# Patient Record
Sex: MALE | NOT HISPANIC OR LATINO | Employment: FULL TIME | URBAN - METROPOLITAN AREA
[De-identification: names, ages, dates, MRNs, and addresses within clinical notes are randomized per-mention and may not be internally consistent; named-entity substitution may affect disease eponyms.]

---

## 2023-11-16 ENCOUNTER — OFFICE VISIT (OUTPATIENT)
Dept: PRIMARY CARE | Facility: CLINIC | Age: 33
End: 2023-11-16
Payer: COMMERCIAL

## 2023-11-16 VITALS
WEIGHT: 227 LBS | SYSTOLIC BLOOD PRESSURE: 128 MMHG | DIASTOLIC BLOOD PRESSURE: 80 MMHG | HEART RATE: 66 BPM | BODY MASS INDEX: 28.37 KG/M2 | OXYGEN SATURATION: 98 %

## 2023-11-16 DIAGNOSIS — S09.91XA TRAUMA OF EAR CANAL, INITIAL ENCOUNTER: Primary | ICD-10-CM

## 2023-11-16 PROCEDURE — 99214 OFFICE O/P EST MOD 30 MIN: CPT | Performed by: FAMILY MEDICINE

## 2023-11-16 PROCEDURE — 1036F TOBACCO NON-USER: CPT | Performed by: FAMILY MEDICINE

## 2023-11-16 RX ORDER — CIPROFLOXACIN AND DEXAMETHASONE 3; 1 MG/ML; MG/ML
4 SUSPENSION/ DROPS AURICULAR (OTIC) 2 TIMES DAILY
Qty: 7.5 ML | Refills: 0 | Status: SHIPPED | OUTPATIENT
Start: 2023-11-16 | End: 2023-11-23

## 2023-11-16 ASSESSMENT — PATIENT HEALTH QUESTIONNAIRE - PHQ9
2. FEELING DOWN, DEPRESSED OR HOPELESS: NOT AT ALL
1. LITTLE INTEREST OR PLEASURE IN DOING THINGS: NOT AT ALL
SUM OF ALL RESPONSES TO PHQ9 QUESTIONS 1 AND 2: 0

## 2023-11-16 NOTE — PATIENT INSTRUCTIONS
You scratch the ear canal causing swelling and pain   It appears to be healing  I sent in an ear drop with antibiotic and steroid to help reduce chance of a secondary infection and also to help with the inflammation and pain.     Follow up with Vonda for your yearly physical ( labs, etc.)

## 2025-03-21 ENCOUNTER — OFFICE VISIT (OUTPATIENT)
Dept: PRIMARY CARE | Facility: CLINIC | Age: 35
End: 2025-03-21
Payer: COMMERCIAL

## 2025-03-21 VITALS
OXYGEN SATURATION: 98 % | HEART RATE: 63 BPM | HEIGHT: 75 IN | SYSTOLIC BLOOD PRESSURE: 144 MMHG | BODY MASS INDEX: 27.85 KG/M2 | WEIGHT: 224 LBS | DIASTOLIC BLOOD PRESSURE: 86 MMHG

## 2025-03-21 DIAGNOSIS — R03.0 ELEVATED BLOOD PRESSURE READING: ICD-10-CM

## 2025-03-21 DIAGNOSIS — Z30.09 SCREENING AND EVALUATION FOR VASECTOMY: ICD-10-CM

## 2025-03-21 DIAGNOSIS — E66.3 OVERWEIGHT WITH BODY MASS INDEX (BMI) OF 28 TO 28.9 IN ADULT: ICD-10-CM

## 2025-03-21 DIAGNOSIS — Z00.00 ANNUAL PHYSICAL EXAM: Primary | ICD-10-CM

## 2025-03-21 PROCEDURE — 1036F TOBACCO NON-USER: CPT | Performed by: NURSE PRACTITIONER

## 2025-03-21 PROCEDURE — 99395 PREV VISIT EST AGE 18-39: CPT | Performed by: NURSE PRACTITIONER

## 2025-03-21 PROCEDURE — 3008F BODY MASS INDEX DOCD: CPT | Performed by: NURSE PRACTITIONER

## 2025-03-21 ASSESSMENT — PROMIS GLOBAL HEALTH SCALE
RATE_QUALITY_OF_LIFE: VERY GOOD
RATE_PHYSICAL_HEALTH: GOOD
RATE_GENERAL_HEALTH: VERY GOOD
RATE_AVERAGE_PAIN: 1
EMOTIONAL_PROBLEMS: RARELY
RATE_AVERAGE_FATIGUE: MILD
CARRYOUT_SOCIAL_ACTIVITIES: EXCELLENT
RATE_MENTAL_HEALTH: VERY GOOD
CARRYOUT_PHYSICAL_ACTIVITIES: COMPLETELY
RATE_SOCIAL_SATISFACTION: EXCELLENT

## 2025-03-21 NOTE — ASSESSMENT & PLAN NOTE
Guidelines reviewed with pt and pt given handout for reference  Encouraged to get home BP cuff and check BP 2 days/week  Call readings in 1 month  FU 6 mos to check BP   Orders:    Follow Up In Primary Care - Established; Future     no

## 2025-03-21 NOTE — PATIENT INSTRUCTIONS
Thank you for seeing me today Benny Kelley, it was a pleasure to see you again!    Today we did your Annual Physical Exam and discussed the following:     Referral for vasectomy placed    Your blood pressure should be </= 130/80.    Today your blood pressure was 144/86  You should avoid foods that are high in sodium and saturated fats  Advised patient to avoid lunch meats, canned soups, pizzas, bread rolls, and sandwiches.   Advised patient to limit salt intake 1,500 mg daily.   Advised patient to exercise 30 mins/3-4 times a week including treadmill or aerobic type.  Avoid stressful situations as this can increase your blood pressure      Obtain a BP monitor and check your blood pressure at home. Check it around the same time each day, at least 1 hour after taking your medication.  Record your blood pressure in a log and  bring your log with you to your next appointment.    Lab work ordered today.  Please have your blood drawn in the next 1-2 weeks.  You need to be FASTING for 12 hours prior to blood draw.  You may only have water.  Please contact your insurance company to ask about the best location to get blood drawn.  We will contact you with the results of your blood work and any necessary adjustments  to your plan of care, if you do not hear from us within 3-5 days of having your blood drawn, please call the office at [phone].      For assistance with scheduling referrals or consultations, please call [phone] or [phone].    For laboratory, radiology, and other tests, please call [phone] ([phone] for pediatrics).   For laboratory locations, please visit [email or web address]   If you do not get results within 7-10 days, or you have any questions or concerns, please send a message, call the office ([phone]), or return to the office for a follow-up appointment.     For acute/sick visits, if you are unable to get an office visit, you can do  a  On Demand Virtual Visit that is accessible via your My Chart account.  For emergencies, call 9-1-1 or go to the nearest Emergency Department.     Please schedule additional appointment(s) to address concern(s) not addressed today.    Please review prescription inserts and published information for possible adverse effects of all medications.     In general, results are discussed over the phone or via  [employer].     You can see your health information, review clinical summaries from office visits & test results online when you follow your health with MY  Chart, a personal health record.   To sign up go to [email or web address].   If you need assistance with signing up or trouble getting into your account call [employer] Patient Line 24/7 at [phone]     RTC 6 MONTHS FOR BP F/U     ~Vonda Burns NP

## 2025-03-21 NOTE — ASSESSMENT & PLAN NOTE
- Counseled on healthy diet and regular exercise  - Fall avoidance information provided  - Personalized prevention plan provided   - Vaccines UTD   - FBW ordered       Orders:    Comprehensive Metabolic Panel; Future    Lipid Panel; Future    CBC; Future

## 2025-04-08 ENCOUNTER — OFFICE VISIT (OUTPATIENT)
Dept: PRIMARY CARE | Facility: CLINIC | Age: 35
End: 2025-04-08
Payer: COMMERCIAL

## 2025-04-08 VITALS
HEIGHT: 75 IN | OXYGEN SATURATION: 98 % | HEART RATE: 80 BPM | SYSTOLIC BLOOD PRESSURE: 140 MMHG | DIASTOLIC BLOOD PRESSURE: 88 MMHG | BODY MASS INDEX: 27.43 KG/M2 | WEIGHT: 220.6 LBS

## 2025-04-08 DIAGNOSIS — I10 PRIMARY HYPERTENSION: Primary | ICD-10-CM

## 2025-04-08 LAB
ALBUMIN SERPL-MCNC: 4.8 G/DL (ref 3.6–5.1)
ALP SERPL-CCNC: 56 U/L (ref 36–130)
ALT SERPL-CCNC: 29 U/L (ref 9–46)
ANION GAP SERPL CALCULATED.4IONS-SCNC: 10 MMOL/L (CALC) (ref 7–17)
AST SERPL-CCNC: 22 U/L (ref 10–40)
BILIRUB SERPL-MCNC: 1.7 MG/DL (ref 0.2–1.2)
BUN SERPL-MCNC: 21 MG/DL (ref 7–25)
CALCIUM SERPL-MCNC: 9.5 MG/DL (ref 8.6–10.3)
CHLORIDE SERPL-SCNC: 104 MMOL/L (ref 98–110)
CHOLEST SERPL-MCNC: 129 MG/DL
CHOLEST/HDLC SERPL: 4.3 (CALC)
CO2 SERPL-SCNC: 27 MMOL/L (ref 20–32)
CREAT SERPL-MCNC: 1.18 MG/DL (ref 0.6–1.26)
EGFRCR SERPLBLD CKD-EPI 2021: 83 ML/MIN/1.73M2
ERYTHROCYTE [DISTWIDTH] IN BLOOD BY AUTOMATED COUNT: 12.9 % (ref 11–15)
GLUCOSE SERPL-MCNC: 150 MG/DL (ref 65–99)
HCT VFR BLD AUTO: 45.1 % (ref 38.5–50)
HDLC SERPL-MCNC: 30 MG/DL
HGB BLD-MCNC: 15.3 G/DL (ref 13.2–17.1)
LDLC SERPL CALC-MCNC: 73 MG/DL (CALC)
MCH RBC QN AUTO: 29.5 PG (ref 27–33)
MCHC RBC AUTO-ENTMCNC: 33.9 G/DL (ref 32–36)
MCV RBC AUTO: 86.9 FL (ref 80–100)
NONHDLC SERPL-MCNC: 99 MG/DL (CALC)
PLATELET # BLD AUTO: 180 THOUSAND/UL (ref 140–400)
PMV BLD REES-ECKER: 10.8 FL (ref 7.5–12.5)
POTASSIUM SERPL-SCNC: 4.2 MMOL/L (ref 3.5–5.3)
PROT SERPL-MCNC: 7.4 G/DL (ref 6.1–8.1)
RBC # BLD AUTO: 5.19 MILLION/UL (ref 4.2–5.8)
SODIUM SERPL-SCNC: 141 MMOL/L (ref 135–146)
TRIGL SERPL-MCNC: 188 MG/DL
WBC # BLD AUTO: 9.4 THOUSAND/UL (ref 3.8–10.8)

## 2025-04-08 PROCEDURE — 3008F BODY MASS INDEX DOCD: CPT | Performed by: NURSE PRACTITIONER

## 2025-04-08 PROCEDURE — 3079F DIAST BP 80-89 MM HG: CPT | Performed by: NURSE PRACTITIONER

## 2025-04-08 PROCEDURE — 99213 OFFICE O/P EST LOW 20 MIN: CPT | Performed by: NURSE PRACTITIONER

## 2025-04-08 PROCEDURE — 1036F TOBACCO NON-USER: CPT | Performed by: NURSE PRACTITIONER

## 2025-04-08 PROCEDURE — 3077F SYST BP >= 140 MM HG: CPT | Performed by: NURSE PRACTITIONER

## 2025-04-08 RX ORDER — LOSARTAN POTASSIUM 50 MG/1
50 TABLET ORAL DAILY
Qty: 90 TABLET | Refills: 1 | Status: SHIPPED | OUTPATIENT
Start: 2025-04-08

## 2025-04-08 ASSESSMENT — PATIENT HEALTH QUESTIONNAIRE - PHQ9
1. LITTLE INTEREST OR PLEASURE IN DOING THINGS: NOT AT ALL
2. FEELING DOWN, DEPRESSED OR HOPELESS: NOT AT ALL
SUM OF ALL RESPONSES TO PHQ9 QUESTIONS 1 AND 2: 0

## 2025-04-08 ASSESSMENT — ENCOUNTER SYMPTOMS
NECK PAIN: 0
SHORTNESS OF BREATH: 0
PND: 0
HYPERTENSION: 1
PALPITATIONS: 0
ORTHOPNEA: 0
HEADACHES: 0
SWEATS: 0
BLURRED VISION: 0

## 2025-04-08 NOTE — PATIENT INSTRUCTIONS
Thank you for seeing me today Benny Kelley, it was a pleasure to see you again!    #HTN  Your blood pressure should be </= 130/80.    Today your blood pressure was 140/88  BEGIN LOSARTAN 50 MG DAILY    You should avoid foods that are high in sodium and saturated fats  Advised patient to avoid lunch meats, canned soups, pizzas, bread rolls, and sandwiches.   Advised patient to limit salt intake 1,500 mg daily.   Advised patient to exercise 30 mins/3-4 times a week including treadmill or aerobic type.  Avoid stressful situations as this can increase your blood pressure    Take your medications as prescribed--do not skip doses    Obtain a BP monitor and check your blood pressure at home. Check it around the same time each day, at least 1 hour after taking your medication.  Record your blood pressure in a log and  bring your log with you to your next appointment.    For assistance with scheduling referrals or consultations, please call [phone] or [phone].    For laboratory, radiology, and other tests, please call [phone] ([phone] for pediatrics).   For laboratory locations, please visit [email or web address]   If you do not get results within 7-10 days, or you have any questions or concerns, please send a message, call the office ([phone]), or return to the office for a follow-up appointment.     For acute/sick visits, if you are unable to get an office visit, you can do a  On Demand Virtual Visit that is accessible via your My Chart account.  For emergencies, call 9-1-1 or go to the nearest Emergency Department.     Please schedule additional appointment(s) to address concern(s) not addressed today.    Please review prescription inserts and published information for possible adverse effects of all medications.     In general, results are discussed over the phone or via  [employer].     You can see your health information, review clinical summaries  from office visits & test results online when you follow your health with MY  Chart, a personal health record.   To sign up go to [email or web address].   If you need assistance with signing up or trouble getting into your account call [employer] Patient Line 24/7 at [phone]     RTC 2 months for BP check    ~Vonda Burns NP

## 2025-04-08 NOTE — PROGRESS NOTES
"Subjective   Chief Complaint   Patient presents with    Follow-up     Benny Kelley is a 35 y.o. male is here today for a follow up to discuss blood pressure. Patient not currently on any medication. Patient has been taking bp at home.   March 22nd: 170/106 March 24th: 164/104 March 26 (taken manually by my daughter's in home nurse): 133/100  March 30th: 146/93 April 4th: 170/94       Patient ID: Benny Gonsales" is a 35 y.o. male who presents for Follow-up (Benny Kelley is a 35 y.o. male is here today for a follow up to discuss blood pressure. Patient not currently on any medication. Patient has been taking bp at home. /March 22nd: 170/106/March 24th: 164/104/March 26 (taken manually by my daughter's in home nurse): 133/100/March 30th: 146/93/April 4th: 170/94).    Hypertension  This is a new problem. The current episode started 1 to 4 weeks ago. The problem is unchanged. The problem is resistant. Pertinent negatives include no anxiety, blurred vision, chest pain, headaches, malaise/fatigue, neck pain, orthopnea, palpitations, peripheral edema, PND, shortness of breath or sweats. Agents associated with hypertension include NSAIDs. Risk factors for coronary artery disease include obesity. There are no compliance problems.      Benny Gonsales" is a 36 yo presenting today to discuss elevated BP   LOV: 3/21/2025    BP was elevated at LOV in March \"144/86\"  Pt has been monitoring at home  Continues to be > 140/80-90  Pt presents today to begin medication    He has not completed FBW, will go today     Current Providers  Specialists: I have reviewed specialist-related care of the patient in the medical record.     Allergies  Allergies   Allergen Reactions    Amoxicillin Rash       Review of Systems   Constitutional:  Negative for malaise/fatigue.   Eyes:  Negative for blurred vision.   Respiratory:  Negative for shortness of breath.    Cardiovascular:  Negative for chest pain, palpitations, " "orthopnea and PND.   Musculoskeletal:  Negative for neck pain.   Neurological:  Negative for headaches.     ROS was completed and all systems are negative with the exception of what was noted in the the HPI.       Objective     Last Recorded Vitals   /88   Pulse 80   Ht 1.905 m (6' 3\")   Wt 100 kg (220 lb 9.6 oz)   SpO2 98%   BMI 27.57 kg/m²      Medications  Current Outpatient Medications   Medication Instructions    losartan (COZAAR) 50 mg, oral, Daily       Past Surgical History:   Procedure Laterality Date    CIRCUMCISION, PRIMARY  1990       Physical Exam   Physical Exam  Vitals reviewed.   Eyes:      Conjunctiva/sclera: Conjunctivae normal.   Cardiovascular:      Pulses: Normal pulses.      Heart sounds: Normal heart sounds.   Pulmonary:      Effort: Pulmonary effort is normal.      Breath sounds: Normal breath sounds.   Skin:     General: Skin is warm and dry.   Neurological:      Mental Status: He is alert and oriented to person, place, and time.       Assessment & Plan  Primary hypertension  Stable 140/88  Begin losartan 50 mg daily  Get FBW done today   FU 2 months     Orders:    losartan (Cozaar) 50 mg tablet; Take 1 tablet (50 mg) by mouth once daily.    Follow Up In Primary Care - Established; Future        "

## 2025-04-10 ENCOUNTER — OFFICE VISIT (OUTPATIENT)
Dept: PRIMARY CARE | Facility: CLINIC | Age: 35
End: 2025-04-10
Payer: COMMERCIAL

## 2025-04-10 VITALS
HEIGHT: 75 IN | OXYGEN SATURATION: 97 % | HEART RATE: 80 BPM | DIASTOLIC BLOOD PRESSURE: 87 MMHG | SYSTOLIC BLOOD PRESSURE: 129 MMHG | BODY MASS INDEX: 26.98 KG/M2 | WEIGHT: 217 LBS

## 2025-04-10 DIAGNOSIS — E11.9 TYPE 2 DIABETES MELLITUS TREATED WITHOUT INSULIN (MULTI): Primary | ICD-10-CM

## 2025-04-10 DIAGNOSIS — R73.01 ELEVATED FASTING BLOOD SUGAR: ICD-10-CM

## 2025-04-10 LAB — POC HEMOGLOBIN A1C: 6.8 % (ref 4.2–6.5)

## 2025-04-10 PROCEDURE — 99213 OFFICE O/P EST LOW 20 MIN: CPT | Performed by: NURSE PRACTITIONER

## 2025-04-10 PROCEDURE — 83036 HEMOGLOBIN GLYCOSYLATED A1C: CPT | Performed by: NURSE PRACTITIONER

## 2025-04-10 PROCEDURE — 3044F HG A1C LEVEL LT 7.0%: CPT | Performed by: NURSE PRACTITIONER

## 2025-04-10 PROCEDURE — 1036F TOBACCO NON-USER: CPT | Performed by: NURSE PRACTITIONER

## 2025-04-10 PROCEDURE — 4010F ACE/ARB THERAPY RXD/TAKEN: CPT | Performed by: NURSE PRACTITIONER

## 2025-04-10 PROCEDURE — 3008F BODY MASS INDEX DOCD: CPT | Performed by: NURSE PRACTITIONER

## 2025-04-10 PROCEDURE — 3079F DIAST BP 80-89 MM HG: CPT | Performed by: NURSE PRACTITIONER

## 2025-04-10 PROCEDURE — 3074F SYST BP LT 130 MM HG: CPT | Performed by: NURSE PRACTITIONER

## 2025-04-10 RX ORDER — METFORMIN HYDROCHLORIDE 500 MG/1
500 TABLET, EXTENDED RELEASE ORAL
Qty: 100 TABLET | Refills: 3 | Status: SHIPPED | OUTPATIENT
Start: 2025-04-10 | End: 2026-05-15

## 2025-04-10 ASSESSMENT — ENCOUNTER SYMPTOMS
FATIGUE: 0
TREMORS: 0
SWEATS: 0
NERVOUS/ANXIOUS: 0
BLACKOUTS: 0
VISUAL CHANGE: 0
SEIZURES: 0
WEAKNESS: 0
HEADACHES: 0
BLURRED VISION: 0
SPEECH DIFFICULTY: 0
DIZZINESS: 0
HUNGER: 0
WEIGHT LOSS: 0
CONFUSION: 0
POLYPHAGIA: 0
POLYDIPSIA: 0

## 2025-04-10 NOTE — PATIENT INSTRUCTIONS
Type 2 diabetes mellitus treated without insulin (Multi)  A1C= 6.8% today   Begin Metformin  mg daily   Schedule eye exam  Regular exercise encouraged   Diabetes diet info provided     Orders:    metFORMIN XR (Glucophage-XR) 500 mg 24 hr tablet; Take 1 tablet (500 mg) by mouth once daily in the evening. Take with meals. Do not crush, chew, or split.    Hemoglobin A1C; Future    RTC 6 MONTHS FOR BP AND A1C

## 2025-04-10 NOTE — ASSESSMENT & PLAN NOTE
A1C= 6.8% today   Begin Metformin 500 mg daily   Schedule eye exam  Regular exercise encouraged     Orders:    metFORMIN XR (Glucophage-XR) 500 mg 24 hr tablet; Take 1 tablet (500 mg) by mouth once daily in the evening. Take with meals. Do not crush, chew, or split.    Hemoglobin A1C; Future

## 2025-04-10 NOTE — PROGRESS NOTES
"Subjective   Chief Complaint   Patient presents with    Follow-up     Benny Kelley is a 35 y.o. male is here today for a follow up for elevated glucose levels. Patient states he had not eaten for over 12 hours when he had his fasting lab work done.        Patient ID: Benny Gonsales" is a 35 y.o. male who presents for Follow-up (Benny Kelley is a 35 y.o. male is here today for a follow up for elevated glucose levels. Patient states he had not eaten for over 12 hours when he had his fasting lab work done. ).    Diabetes  He has type 2 diabetes mellitus. No MedicAlert identification noted. The initial diagnosis of diabetes was made 0 days ago. Pertinent negatives for hypoglycemia include no confusion, dizziness, headaches, hunger, mood changes, nervousness/anxiousness, pallor, seizures, sleepiness, speech difficulty, sweats or tremors. Pertinent negatives for diabetes include no blurred vision, no chest pain, no fatigue, no foot paresthesias, no foot ulcerations, no polydipsia, no polyphagia, no polyuria, no visual change, no weakness and no weight loss. Pertinent negatives for hypoglycemia complications include no blackouts, no hospitalization, no nocturnal hypoglycemia, no required assistance and no required glucagon injection. Symptoms are stable. Pertinent negatives for diabetic complications include no CVA, heart disease, impotence, nephropathy, peripheral neuropathy, PVD or retinopathy. Risk factors for coronary artery disease include hypertension and obesity. Current diabetic treatment includes diet. He is compliant with treatment all of the time. His weight is stable. He has not had a previous visit with a dietitian. He participates in exercise every other day. His lunch blood glucose is taken between 12-1 pm. He does not see a podiatrist.Eye exam is not current.     Benny \"Jabari" is a 34 yo est male presenting today for A1C as his recent FBG was elevated     Pt did FBW earlier this " "week and his FBG was 150  A1C today= 6.8%---> new onset T2DM  Pt states both his grandfathers are diabetic, unknown if his parents are   Last eye exam: unknown, encouraged to schedule       Allergies   Allergen Reactions    Amoxicillin Rash       Review of Systems   Constitutional:  Negative for fatigue and weight loss.   Eyes:  Negative for blurred vision.   Cardiovascular:  Negative for chest pain.   Endocrine: Negative for polydipsia, polyphagia and polyuria.   Genitourinary:  Negative for impotence.   Skin:  Negative for pallor.   Neurological:  Negative for dizziness, tremors, seizures, speech difficulty, weakness and headaches.   Psychiatric/Behavioral:  Negative for confusion. The patient is not nervous/anxious.      ROS was completed and all systems are negative with the exception of what was noted in the the HPI.       Objective     Last Recorded Vitals   /87   Pulse 80   Ht 1.905 m (6' 3\")   Wt 98.4 kg (217 lb)   SpO2 97%   BMI 27.12 kg/m²      Medications  Current Outpatient Medications   Medication Instructions    losartan (COZAAR) 50 mg, oral, Daily    metFORMIN XR (GLUCOPHAGE-XR) 500 mg, oral, Daily with evening meal, Do not crush, chew, or split.       Past Surgical History:   Procedure Laterality Date    CIRCUMCISION, PRIMARY  1990     Office Visit on 04/10/2025   Component Date Value Ref Range Status    POC HEMOGLOBIN A1c 04/10/2025 6.8 (A)  4.2 - 6.5 % Final         Physical Exam  Vitals reviewed.   Cardiovascular:      Pulses: Normal pulses.      Heart sounds: Normal heart sounds.   Pulmonary:      Effort: Pulmonary effort is normal.      Breath sounds: Normal breath sounds.   Skin:     General: Skin is warm and dry.   Neurological:      Mental Status: He is alert and oriented to person, place, and time.   Psychiatric:         Mood and Affect: Mood normal.       Assessment & Plan  Type 2 diabetes mellitus treated without insulin (Multi)  A1C= 6.8% today   Begin Metformin 500 mg " daily   Schedule eye exam  Regular exercise encouraged     Orders:    metFORMIN XR (Glucophage-XR) 500 mg 24 hr tablet; Take 1 tablet (500 mg) by mouth once daily in the evening. Take with meals. Do not crush, chew, or split.    Hemoglobin A1C; Future    Elevated fasting blood sugar  A1C= 6.8% today   Orders:    POCT glycosylated hemoglobin (Hb A1C) manually resulted

## 2025-06-09 ENCOUNTER — APPOINTMENT (OUTPATIENT)
Dept: PRIMARY CARE | Facility: CLINIC | Age: 35
End: 2025-06-09
Payer: COMMERCIAL

## 2025-06-11 ENCOUNTER — APPOINTMENT (OUTPATIENT)
Dept: PRIMARY CARE | Facility: CLINIC | Age: 35
End: 2025-06-11
Payer: COMMERCIAL

## 2025-06-23 NOTE — PROGRESS NOTES
"Subjective   Patient ID: Benny Kelley \"Jabari" is a 35 y.o. male    HPI  35 y.o. male who presented for vasectomy evaluation, the patient is  with 2 children aged 1 year and 1 month. I had a long and extensive discussion with the patient regarding vasectomy. I informed him that he should consider this procedure as permanent and he should be 100% sure about proceeding with it. I explained to him in detail the steps of the vasectomy, I also had a long discussion with him regarding the possible side effects including infection, hematoma, bleeding, chronic pain, testicular congestion, injury to surrounding structure. We also discussed the multiple studies linking vasectomy to prostate cancer I explained to him the correlation between this procedure and prostate cancer. The patient verbalized understanding of all the risks and benefits and would like to proceed. I explained to him that there is a small chance of spontaneous return of the semen because of reconnection of his vas ending. I also told him that he is not close to the sterile unless it is proven by a post vasectomy semen analysis after around 3 months. I also explained to him that some man with a much longer to clear his semen from there, this most could take up to 6 months during which he would be still able to proceed.         Review of Systems    All systems were reviewed. Anything negative was noted in the HPI.    Objective   Physical Exam  Genitourinary:     Pubic Area: No rash.       Penis: Normal and circumcised. No hypospadias.       Testes:         Right: Mass, tenderness, swelling, testicular hydrocele or varicocele not present. Right testis is descended.         Left: Mass, tenderness, swelling, testicular hydrocele or varicocele not present. Left testis is descended.      Epididymis:      Right: Not inflamed or enlarged. No mass or tenderness.      Left: Not inflamed or enlarged. No mass or tenderness.         General: Well developed, " well nourished, alert and cooperative, appears in no acute distress   Eyes: Non-injected conjunctiva, sclera clear, no proptosis   Cardiac: Extremities are warm and well perfused. No edema, cyanosis or pallor   Lungs: Breathing is easy, non-labored. Speaking in clear and complete sentences. Normal diaphragmatic movement   MSK: Ambulatory with steady gait, unassisted   Neuro: Alert and oriented to person, place, and time   Psych: Demonstrates good judgment and reason, without hallucinations, abnormal affect or abnormal behaviors   Skin: No obvious lesions, no rashes       No CVA tenderness bilaterally   No suprapubic pain or discomfort       Medical History[1]      Surgical History[2]        Assessment/Plan   Vasectomy Evaluation    35 y.o. male who presents for the above condition, Vasectomy Evaluation     Patient presents today for evaluation of vasectomy. We had an extensive discussion about the procedure and post-procedure protocol. We discussed that vasectomy is intended to be a permanent form of contraception. There are options for fertility post vasectomy, including vasectomy reversal and sperm retrieval but these are not always successful and can be rather expensive.  We highlighted that it is not an immediate form of contraception, and that another form is required until vas occlusion is confirmed with a post-vasectomy semen analysis. We recommend that the patient refrain from ejaculation for 1 week post-procedure and we would be checking a post-vasectomy semen analysis in 2-3 months, or 15-20 ejaculates. Need for repeat vasectomy is very low, <1%. There is a very small risk for pregnancy after vasectomy (1 in 2,000). We define a successful vasectomy by achieving azoospermia or less than 100,000 non-motile sperm on post-vasectomy semen analysis.  We discussed that the procedure would be done in the office under local anesthesia. Complications were discussed including but not limited to pain, which can be  chronic in nature, bleeding/hematoma formation, and infection. We recommended ice and rest for the next 48-72hrs. Patient may be prescribed an anti-inflammatory for pain control. Patient is instructed to refrain from strenuous activity for 2 weeks.  No barriers to learning were identified. After all of the patient’s questions were satisfactorily answered, he expressed understanding of the risks of surgery and wishes to proceed with vasectomy.        Plan:  - Schedule patient for vasectomy       E&M visit today is associated with current or anticipated ongoing medical care services related to a patient's single, serious condition or a complex condition.     6/24/2025    Scribe Attestation  By signing my name below, I, Alexey Norton attest that this documentation has been prepared under the direction and in the presence of Dr. Andrae Lilly.         [1]   Past Medical History:  Diagnosis Date    Encounter for immunization 07/27/2022    Need for meningococcal vaccination   [2]   Past Surgical History:  Procedure Laterality Date    CIRCUMCISION, PRIMARY  1990

## 2025-06-24 ENCOUNTER — OFFICE VISIT (OUTPATIENT)
Dept: UROLOGY | Facility: HOSPITAL | Age: 35
End: 2025-06-24
Payer: COMMERCIAL

## 2025-06-24 DIAGNOSIS — Z30.09 VASECTOMY EVALUATION: Primary | ICD-10-CM

## 2025-06-24 PROCEDURE — 3044F HG A1C LEVEL LT 7.0%: CPT | Performed by: STUDENT IN AN ORGANIZED HEALTH CARE EDUCATION/TRAINING PROGRAM

## 2025-06-24 PROCEDURE — 99204 OFFICE O/P NEW MOD 45 MIN: CPT | Performed by: STUDENT IN AN ORGANIZED HEALTH CARE EDUCATION/TRAINING PROGRAM

## 2025-06-24 PROCEDURE — 4010F ACE/ARB THERAPY RXD/TAKEN: CPT | Performed by: STUDENT IN AN ORGANIZED HEALTH CARE EDUCATION/TRAINING PROGRAM

## 2025-06-24 PROCEDURE — 99214 OFFICE O/P EST MOD 30 MIN: CPT | Performed by: STUDENT IN AN ORGANIZED HEALTH CARE EDUCATION/TRAINING PROGRAM

## 2025-06-29 NOTE — PROGRESS NOTES
Subjective   Benny Kelley is a 33 y.o. male who presents for Ear Fullness (Patient has left ear fullness and little pain x 2 days. Patient used Q-tip on esr 2 days prior).    HPI   Cleaning ears with Q-tip 4 days ago  A few days ago some fullness and pain     ROS was completed and all systems are negative with the exception of what was noted in the the HPI.     Objective     /80   Pulse 66   Wt 103 kg (227 lb)   SpO2 98%   BMI 28.37 kg/m²      Physical Exam  GEN: A+O, no acute distress  HEENT: NC/AT, Oropharynx clear, no exudates, TM partially obscured by wax on the left with some some dried blood at the 4 o'clock area of the canal; R TM looks wnl; Extraoccular muscles intact, no facial droop; no thyromegaly or cervical LAD  RESP: CTAB, no wheezes   CV: RRR, no murmurs  ABD: soft, non-tender, + BS  SKIN: no rashes or bruising, no peripheral edema   NEURO: CN II-XII grossly intact, moves all extremities equally, no tremor   PSYCH: normal affect, appropriate mood     Assessment/Plan   Problem List Items Addressed This Visit    None  You scratch the ear canal causing swelling and pain   It appears to be healing  I sent in an ear drop with antibiotic and steroid to help reduce chance of a secondary infection and also to help with the inflammation and pain.     Follow up with Vonda for your yearly physical ( labs, etc.)         Khadijah Bo DO, MSMed, ABOM  [address]   [address]   [place] [address]  Ph. [phone]  Fx. [phone]   PAST SURGICAL HISTORY:  S/P cardiac catheterization 1999

## 2025-07-10 DIAGNOSIS — E11.9 TYPE 2 DIABETES MELLITUS TREATED WITHOUT INSULIN (MULTI): ICD-10-CM

## 2025-08-20 ENCOUNTER — APPOINTMENT (OUTPATIENT)
Dept: UROLOGY | Facility: HOSPITAL | Age: 35
End: 2025-08-20
Payer: COMMERCIAL

## 2025-09-19 ENCOUNTER — APPOINTMENT (OUTPATIENT)
Dept: PRIMARY CARE | Facility: CLINIC | Age: 35
End: 2025-09-19
Payer: COMMERCIAL

## 2025-10-30 ENCOUNTER — APPOINTMENT (OUTPATIENT)
Dept: PRIMARY CARE | Facility: CLINIC | Age: 35
End: 2025-10-30
Payer: COMMERCIAL